# Patient Record
Sex: MALE | Race: WHITE | Employment: FULL TIME
[De-identification: names, ages, dates, MRNs, and addresses within clinical notes are randomized per-mention and may not be internally consistent; named-entity substitution may affect disease eponyms.]

---

## 2024-04-05 ENCOUNTER — HOSPITAL ENCOUNTER (OUTPATIENT)
Facility: HOSPITAL | Age: 64
Discharge: HOME | End: 2024-04-08
Payer: MEDICARE

## 2024-04-05 ENCOUNTER — TRANSCRIBE ORDERS (OUTPATIENT)
Facility: HOSPITAL | Age: 64
End: 2024-04-05

## 2024-04-05 VITALS
DIASTOLIC BLOOD PRESSURE: 85 MMHG | HEART RATE: 87 BPM | HEIGHT: 73 IN | WEIGHT: 236 LBS | BODY MASS INDEX: 31.28 KG/M2 | SYSTOLIC BLOOD PRESSURE: 146 MMHG

## 2024-04-05 DIAGNOSIS — C61 PROSTATE CANCER (HCC): Primary | ICD-10-CM

## 2024-04-05 DIAGNOSIS — C61 MALIGNANT NEOPLASM OF PROSTATE (HCC): Primary | ICD-10-CM

## 2024-04-05 PROCEDURE — 99202 OFFICE O/P NEW SF 15 MIN: CPT

## 2024-04-05 RX ORDER — ROSUVASTATIN CALCIUM 10 MG/1
10 TABLET, COATED ORAL DAILY
COMMUNITY

## 2024-04-05 RX ORDER — LOSARTAN POTASSIUM 50 MG/1
50 TABLET ORAL DAILY
COMMUNITY

## 2024-04-05 RX ORDER — BUPROPION HYDROCHLORIDE 150 MG/1
150 TABLET ORAL
COMMUNITY
Start: 2024-03-10

## 2024-04-05 RX ORDER — DICLOFENAC SODIUM 75 MG/1
75 TABLET, DELAYED RELEASE ORAL 2 TIMES DAILY
COMMUNITY
Start: 2023-12-20

## 2024-04-05 ASSESSMENT — PAIN SCALES - GENERAL: PAINLEVEL_OUTOF10: 1

## 2024-04-05 NOTE — CONSULTS
RADIATION ONCOLOGY NEW PATIENT CONSULT NOTE    Patient Name: Scot Norman  Patient YOB: 1960   Medical Record Number: [id number]  Referring Physician: Wili Salazar MD  [address] [id number]  Primary Care Provider: Tena Davey MD    DIAGNOSIS & STAGING:  Cancer Staging   No matching staging information was found for the patient.    ICD-10-CM    1. Prostate cancer (HCC)  C61         AJCC Staging has been reviewed      CHIEF COMPLAINT: Intermediate risk prostate cancer, Clayton 3 + 4 (in a total of 1/12 template cores and 1/1 MRI targets), PSA 7.1 ng/mL, T1c.    Indeterminate abnormal findings on PSMA PET/CT.       HISTORY OF PRESENT ILLNESS:      Mr. Norman is a 63 year old man with HTN, high cholesterol, and a new diagnosis prostate cancer. He was referred to urological attention for elevated PSA, most recently measured prior to consult as 7.1 ng/mL on 10/20/23.    MRI 1/4/24 demonstrated a 38 cc prostate with a PI-RADS 4 right mid/base transitional zone lesion, measured at 1.1 x 1.4 x 1.2 cm. No extracapsular extension. \"Multiple large right iliac lymph node including once left para-aortic lymph node and one in the right inguinal region concerning for metastases. 7 mm enhancing focus in the left iliac bone concerning for metastases\". I reviewed this MRI personally and agree with the radiology report.   His biopsy was 2/21/24 with Dr. Paredes. The prostate was 36.71 cc by TRUS, with clinical stage T1c. Pathology revealed cancer in 1/12 template cores and 1/1 MRI targets, with Clayton 3 + 4. He had a cancer talk with Dr. Salazar and I reviewed the note.    PSMA PET/CT 3/21/24 showed abnormal focal binding in prostate. Faint binding In multiple abnormal appearing lymph nodes. Unusual, could consider biopsy. No bone uptake or in left iliac bone. I reviewed this scan personally and agree with the radiology report.   He was accompanied to his consult by wife. His urinary symptoms are

## 2024-04-09 ENCOUNTER — TRANSCRIBE ORDERS (OUTPATIENT)
Facility: HOSPITAL | Age: 64
End: 2024-04-09

## 2024-04-09 DIAGNOSIS — C61 PROSTATE CANCER (HCC): Primary | ICD-10-CM

## 2024-04-24 ENCOUNTER — HOSPITAL ENCOUNTER (OUTPATIENT)
Facility: HOSPITAL | Age: 64
Discharge: HOME | End: 2024-04-27
Attending: RADIOLOGY
Payer: MEDICARE

## 2024-04-24 VITALS
TEMPERATURE: 98 F | OXYGEN SATURATION: 96 % | HEART RATE: 81 BPM | SYSTOLIC BLOOD PRESSURE: 126 MMHG | DIASTOLIC BLOOD PRESSURE: 68 MMHG | RESPIRATION RATE: 16 BRPM

## 2024-04-24 DIAGNOSIS — C61 PROSTATE CANCER (HCC): ICD-10-CM

## 2024-04-24 PROCEDURE — 2709999900 CT GUIDED NEEDLE PLACEMENT

## 2024-04-24 PROCEDURE — 6360000002 HC RX W HCPCS: Performed by: PHYSICIAN ASSISTANT

## 2024-04-24 PROCEDURE — 99153 MOD SED SAME PHYS/QHP EA: CPT

## 2024-04-24 PROCEDURE — 99152 MOD SED SAME PHYS/QHP 5/>YRS: CPT

## 2024-04-24 RX ORDER — MIDAZOLAM HYDROCHLORIDE 1 MG/ML
5 INJECTION INTRAMUSCULAR; INTRAVENOUS PRN
Status: DISCONTINUED | OUTPATIENT
Start: 2024-04-24 | End: 2024-04-24

## 2024-04-24 RX ORDER — FENTANYL CITRATE 50 UG/ML
100 INJECTION, SOLUTION INTRAMUSCULAR; INTRAVENOUS AS NEEDED
Status: DISCONTINUED | OUTPATIENT
Start: 2024-04-24 | End: 2024-04-24

## 2024-04-24 RX ADMIN — MIDAZOLAM 1 MG: 1 INJECTION INTRAMUSCULAR; INTRAVENOUS at 10:24

## 2024-04-24 RX ADMIN — FENTANYL CITRATE 25 MCG: 50 INJECTION INTRAMUSCULAR; INTRAVENOUS at 10:30

## 2024-04-24 RX ADMIN — FENTANYL CITRATE 25 MCG: 50 INJECTION INTRAMUSCULAR; INTRAVENOUS at 10:24

## 2024-04-24 RX ADMIN — MIDAZOLAM 1 MG: 1 INJECTION INTRAMUSCULAR; INTRAVENOUS at 10:27

## 2024-04-24 RX ADMIN — MIDAZOLAM 1 MG: 1 INJECTION INTRAMUSCULAR; INTRAVENOUS at 10:40

## 2024-04-24 RX ADMIN — FENTANYL CITRATE 25 MCG: 50 INJECTION INTRAMUSCULAR; INTRAVENOUS at 10:27

## 2024-04-24 RX ADMIN — MIDAZOLAM 1 MG: 1 INJECTION INTRAMUSCULAR; INTRAVENOUS at 10:30

## 2024-04-24 RX ADMIN — FENTANYL CITRATE 25 MCG: 50 INJECTION INTRAMUSCULAR; INTRAVENOUS at 10:40

## 2024-04-24 ASSESSMENT — PAIN SCALES - GENERAL: PAINLEVEL_OUTOF10: 0

## 2024-04-24 NOTE — PROGRESS NOTES
0800  Pt brought back to [facility] for scheduled CT guided right inguinal lymph node biopsy.  Confirmed NPO and ride.  0820  IV placed.  [facility], S1S2.  0835 Gena GRIJALVA in  to discuss procedure and sedation plan with pt and RN.  Allergies reviewed. Consent signed.  1015 Pt brought to CT for procedure.  Sedation started at 1024. Time out performed at  1029.  Procedure started at 1030 and ended at 1050.  Pt received a total of 4mg of versed and 100mcg of fentanyl over the course of procedure.  Pt tolerated procedure well.  Denies pain.  Dressing to procedure site CDI.   VS monitored throughout procedure.  1102 Pt returned to .  Given crackers and gingerale.  1108 Spoke with wife, to let her know procedure complete and time of discharge.   1130 Reviewed discharge instructions with patient.   Opportunity given for questions.  Pt verbalized understanding. Copy provided.  IV removed.     1150 Pt dressed.  Escorted pt out to vehicle in w/c for discharge to home with wife.

## 2024-04-24 NOTE — H&P
mcg  100 mcg IntraVENous PRN    midazolam (VERSED) injection 5 mg  5 mg IntraVENous PRN        Review of Systems:  Patient denies fever, chills, cough, headache, vision changes, difficulty swallowing, shortness of breath, chest pain, abdominal pain, nausea, vomiting, changes in bladder or bowel habits, extremity weakness, numbness, tingling or swelling. The remainder of the review of systems is negative for any additional contributing elements.        Physical Exam:  Blood pressure 129/82, pulse 80, resp. rate 14, SpO2 97 %.  General:  Calm, cooperative, NAD  HEENT:  NCAT, EOMI, conjunctiva clear, MMM  Heart:  RRR, S1S2 normal  Lungs:  CTAB, NWOB  Abdomen:  Soft, NT, ND  Extremities:  MAEW, no cyanosis or edema  Skin:  Warm and dry, color normal, no rashes  Neurological:  AAOX3, speech clear and coherent    Mallampati Airway Assessment: III (soft palate, base of uvula visible)    ASA Classification: ASA 2 - Patient with mild systemic disease with no functional limitations    Laboratory:    No results for input(s): \"HGB\", \"HGBEXT\", \"HCT\", \"HCTEXT\", \"WBC\", \"PLT\", \"PLTEXT\", \"PTT\", \"INR\", \"BUN\", \"K\", \"CRCLT\" in the last 72 hours.    Invalid input(s): \"PT\", \"CREA\"    Imaging:  All appropriate imaging has been reviewed by the radiologist.    Impression/Plan:  Patient has been evaluated and deemed an appropriate candidate for intravenous sedation. Based on history and presentation he is a candidate for CT-guided right inguinal lymph node biopsy.     The above procedure was explained to the patient/consenting party. Benefits, risks and alternative therapies reviewed and all questions answered to his satisfaction. At this time he wishes to proceed.     Please note that Dr. Irina Orta participated in the provision of these services. We appreciate the kind consultation and the opportunity to participate in Scot IRBY Emerson 's care.        Gena Michael PA-C  Interventional Radiology  [facility]  [facility]

## 2024-04-24 NOTE — DISCHARGE INSTRUCTIONS
BIOPSY DISCHARGE INSTRUCTIONS    General Instructions:     A biopsy is the removal of a small piece of tissue for microscopic examination or testing. Healthy tissue can be obtained for the purpose of tissue-type matching for transplants. Unhealthy tissues are more commonly biopsied to diagnose disease.     Liver Biopsy:     This test helps detect cancer, infections, and the cause of an enlargement of the liver or elevated liver enzymes. It also helps to diagnose a number of liver diseases. The pain associated with the procedure may be felt in the shoulder. The risks include internal bleeding, pneumothorax, and injury to the surrounding organs.       General Biopsy:     A mass can grow in any area of the body, and we are taking a specimen as ordered by your doctor. The risks are the same. They include bleeding, pain, and infection.     Home Care Instructions:     You may resume your regular diet and medication regimen. Do not drink alcohol, drive, or make any important legal decisions in the next 24 hours. Do not lift anything heavier than a gallon of milk until the soreness goes away. You may use over the counter acetaminophen or ibuprofen for the soreness. You may apply an ice pack to the affected area for 20-30 minutes at time for the first 24 hours. After that, you may apply a heat pack.     Call If:     You should call your Physician and/or the Radiology Nurse if you have any questions or concerns about the biopsy site. Call if you should have increased pain, fever, redness, drainage, or bleeding more than a small spot on the bandage.     Follow-Up Instructions: Please see your ordering doctor as he/she has requested.      [facility]  Radiology / Special Procedures Dept  [phone] or [phone]          Procedural Sedation Discharge instructions:     The sedation medication you received for your procedure today was Versed and /or Fentanyl. Please be cautious as you may be at higher risk for

## 2024-04-30 ENCOUNTER — HOSPITAL ENCOUNTER (OUTPATIENT)
Facility: HOSPITAL | Age: 64
Discharge: HOME | End: 2024-05-03

## 2024-04-30 VITALS
SYSTOLIC BLOOD PRESSURE: 152 MMHG | HEIGHT: 73 IN | DIASTOLIC BLOOD PRESSURE: 94 MMHG | HEART RATE: 74 BPM | RESPIRATION RATE: 16 BRPM | WEIGHT: 235 LBS | BODY MASS INDEX: 31.14 KG/M2

## 2024-04-30 DIAGNOSIS — C61 PROSTATE CANCER (HCC): Primary | ICD-10-CM

## 2024-04-30 ASSESSMENT — PAIN SCALES - GENERAL: PAINLEVEL_OUTOF10: 0

## 2024-04-30 NOTE — CONSULTS
intermediate risk prostate cancer, Clayton 3 + 4 (in a total of 1/12 template cores and 1/1 MRI targets), PSA 7.1 ng/mL, T1c.    Indeterminate abnormal findings on PSMA PET/CT.     CT guided core needle biopsy did not show malignancy. We discussed this and we will await the result of the two send out labs. At the time based on my discussion with the pathologist, we will move forward, low clinical suspicion of malignancy causing LAD.  He would like to move forward with SBRT here.    Plan:    - Follow pending pathology labs remaining on core needle biopsy of right inguinal lymph node. If either test is positive, will plan to refer to medical oncology for workup and comanagement.   - He would like to move forward with SBRT.  - I will ask my urology colleagues to place prostate fiducials along with [employer].   - CT/MR simulation to follow. Radiation would start about a week after simulation.    - We discussed that prostate SBRT would consist of 5 treatments delivered every other day over approximately 2 weeks.       - The patient and his wife were encouraged to call with any questions or concerns.        By signing my name below, I, Leana Hair, attest that this documentation has been prepared under the direct and in the presence of Kevin Hernandez MD.     ICHOLO MD, PhD, personally performed the services described in this documentation.  All medical record entries made by the scribe were at my direction and in my presence.  I have reviewed the chart and agree that the record reflects my personal performance and is accurate and complete.      TIME and COUNSELING:  I spent a total of 48 minutes in care of this patient on the date of service.         PAST MEDICAL HISTORY:  Past Medical History:   Diagnosis Date    Hyperlipidemia     Hypertension     Prostate cancer (HCC) 02/2024         PAST SURGICAL HISTORY:  Past Surgical History:   Procedure Laterality Date    ORTHOPEDIC SURGERY      left knee

## 2024-06-05 ENCOUNTER — HOSPITAL ENCOUNTER (OUTPATIENT)
Facility: HOSPITAL | Age: 64
Discharge: HOME | End: 2024-06-08
Attending: RADIOLOGY

## 2024-06-05 DIAGNOSIS — C61 PROSTATE CANCER (HCC): Primary | ICD-10-CM

## 2024-06-05 RX ORDER — LORAZEPAM 1 MG/1
1-2 TABLET ORAL
Qty: 2 TABLET | Refills: 0 | Status: SHIPPED | OUTPATIENT
Start: 2024-06-05 | End: 2024-06-05

## 2024-06-12 ENCOUNTER — HOSPITAL ENCOUNTER (OUTPATIENT)
Age: 64
Discharge: HOME | End: 2024-06-15
Payer: MEDICARE

## 2024-06-12 DIAGNOSIS — C61 MALIGNANT NEOPLASM OF PROSTATE (HCC): ICD-10-CM

## 2024-06-12 PROCEDURE — 76498 UNLISTED MR PROCEDURE: CPT

## 2024-06-25 ENCOUNTER — HOSPITAL ENCOUNTER (OUTPATIENT)
Facility: HOSPITAL | Age: 64
Discharge: HOME | End: 2024-06-28
Attending: RADIOLOGY

## 2024-06-25 LAB
RAD ONC ARIA COURSE FIRST TREATMENT DATE: NORMAL
RAD ONC ARIA COURSE ID: NORMAL
RAD ONC ARIA COURSE INTENT: NORMAL
RAD ONC ARIA COURSE LAST TREATMENT DATE: NORMAL
RAD ONC ARIA COURSE SESSION NUMBER: 1
RAD ONC ARIA COURSE START DATE: NORMAL
RAD ONC ARIA COURSE TREATMENT ELAPSED DAYS: 0
RAD ONC ARIA PLAN FRACTIONS TREATED TO DATE: 1
RAD ONC ARIA PLAN ID: NORMAL
RAD ONC ARIA PLAN PRESCRIBED DOSE PER FRACTION: 8 GY
RAD ONC ARIA PLAN PRIMARY REFERENCE POINT: NORMAL
RAD ONC ARIA PLAN TOTAL FRACTIONS PRESCRIBED: 5
RAD ONC ARIA PLAN TOTAL PRESCRIBED DOSE: 4000 CGY
RAD ONC ARIA REFERENCE POINT DOSAGE GIVEN TO DATE: 8 GY
RAD ONC ARIA REFERENCE POINT DOSAGE GIVEN TO DATE: 8.23 GY
RAD ONC ARIA REFERENCE POINT DOSAGE GIVEN TO DATE: 9 GY
RAD ONC ARIA REFERENCE POINT DOSAGE GIVEN TO DATE: 9 GY
RAD ONC ARIA REFERENCE POINT ID: NORMAL
RAD ONC ARIA REFERENCE POINT SESSION DOSAGE GIVEN: 8 GY
RAD ONC ARIA REFERENCE POINT SESSION DOSAGE GIVEN: 8.23 GY
RAD ONC ARIA REFERENCE POINT SESSION DOSAGE GIVEN: 9 GY
RAD ONC ARIA REFERENCE POINT SESSION DOSAGE GIVEN: 9 GY

## 2024-06-27 ENCOUNTER — HOSPITAL ENCOUNTER (OUTPATIENT)
Facility: HOSPITAL | Age: 64
Discharge: HOME | End: 2024-06-30
Attending: RADIOLOGY

## 2024-06-27 DIAGNOSIS — C61 PROSTATE CANCER (HCC): Primary | ICD-10-CM

## 2024-06-27 LAB
RAD ONC ARIA COURSE FIRST TREATMENT DATE: NORMAL
RAD ONC ARIA COURSE ID: NORMAL
RAD ONC ARIA COURSE INTENT: NORMAL
RAD ONC ARIA COURSE LAST TREATMENT DATE: NORMAL
RAD ONC ARIA COURSE SESSION NUMBER: 2
RAD ONC ARIA COURSE START DATE: NORMAL
RAD ONC ARIA COURSE TREATMENT ELAPSED DAYS: 2
RAD ONC ARIA PLAN FRACTIONS TREATED TO DATE: 2
RAD ONC ARIA PLAN ID: NORMAL
RAD ONC ARIA PLAN PRESCRIBED DOSE PER FRACTION: 8 GY
RAD ONC ARIA PLAN PRIMARY REFERENCE POINT: NORMAL
RAD ONC ARIA PLAN TOTAL FRACTIONS PRESCRIBED: 5
RAD ONC ARIA PLAN TOTAL PRESCRIBED DOSE: 4000 CGY
RAD ONC ARIA REFERENCE POINT DOSAGE GIVEN TO DATE: 16 GY
RAD ONC ARIA REFERENCE POINT DOSAGE GIVEN TO DATE: 16.47 GY
RAD ONC ARIA REFERENCE POINT DOSAGE GIVEN TO DATE: 18 GY
RAD ONC ARIA REFERENCE POINT DOSAGE GIVEN TO DATE: 18 GY
RAD ONC ARIA REFERENCE POINT ID: NORMAL
RAD ONC ARIA REFERENCE POINT SESSION DOSAGE GIVEN: 8 GY
RAD ONC ARIA REFERENCE POINT SESSION DOSAGE GIVEN: 8.23 GY
RAD ONC ARIA REFERENCE POINT SESSION DOSAGE GIVEN: 9 GY
RAD ONC ARIA REFERENCE POINT SESSION DOSAGE GIVEN: 9 GY

## 2024-06-27 RX ORDER — TAMSULOSIN HYDROCHLORIDE 0.4 MG/1
0.4 CAPSULE ORAL
Qty: 30 CAPSULE | Refills: 3 | Status: SHIPPED | OUTPATIENT
Start: 2024-06-27

## 2024-06-27 ASSESSMENT — PATIENT HEALTH QUESTIONNAIRE - PHQ9
SUM OF ALL RESPONSES TO PHQ QUESTIONS 1-9: 0
SUM OF ALL RESPONSES TO PHQ QUESTIONS 1-9: 0
1. LITTLE INTEREST OR PLEASURE IN DOING THINGS: NOT AT ALL
SUM OF ALL RESPONSES TO PHQ QUESTIONS 1-9: 0
SUM OF ALL RESPONSES TO PHQ9 QUESTIONS 1 & 2: 0
SUM OF ALL RESPONSES TO PHQ QUESTIONS 1-9: 0
2. FEELING DOWN, DEPRESSED OR HOPELESS: NOT AT ALL

## 2024-06-27 ASSESSMENT — PAIN SCALES - GENERAL: PAINLEVEL_OUTOF10: 0

## 2024-06-27 NOTE — PROGRESS NOTES
[facility]    Radiation Oncology Weekly Progress Note  Encounter Date: 24     Scot Norman Jr.  MRN: [id number]  : 1960       ICD-10-CM    1. Prostate cancer (HCC)  C61           Diagnosis   Favorable intermediate risk prostate cancer, Buford 3 + 4 (in a total of 12 template cores and  MRI targets), PSA 7.1 ng/mL, T1c.  Definitive radiation with prostate SBRT, 8 Gy x 5, with MRI/PSMA lesion microboost 9 Gy x 5.    AJCC Staging has been reviewed.    Interval History   Mr. Norman is a 64 y.o. male seen today for his weekly on-treatment evaluation    2024: Doing well today.  Accompanied by his wife.  Seen after his second fraction of prostate SBRT.  He took tamsulosin around the time of his biopsy and denies having had any side effects from it.  I will send him tamsulosin in anticipation of urinary side effects.  We reviewed the appropriate use of tamsulosin and its side effects, including the transient hypotension sometimes seen with the first few doses and how to identify this and manage it if so.  We reviewed the expected short-term side effects of radiation and the follow-up plan.  All questions answered.    Physical exam: NAD       Treatment Details:     Treatment Site Dose/Fx (cGy) #Fx Current Dose (cGy) Total Planned Dose (cGy) Start Date Most Recent Treatment Date   Prostate 800 2 1600 4000 24     Concurrent Therapy: No concurrent systemic therapy      Allergies and Medications     Allergies   Allergen Reactions    Augmentin [Amoxicillin-Pot Clavulanate]        Current Outpatient Medications   Medication Instructions    buPROPion (WELLBUTRIN XL) 150 mg    diclofenac (VOLTAREN) 75 mg, Oral, 2 TIMES DAILY    losartan (COZAAR) 50 mg, Oral, DAILY    rosuvastatin (CRESTOR) 10 mg, Oral, DAILY        Assessment & Plan   - Continue radiation treatment as planned  - Treatment setup and plan reviewed. Port images/CBCT images reviewed. Appropriate laboratory work

## 2024-07-01 ENCOUNTER — HOSPITAL ENCOUNTER (OUTPATIENT)
Facility: HOSPITAL | Age: 64
Discharge: HOME | End: 2024-07-04
Attending: RADIOLOGY

## 2024-07-01 LAB
RAD ONC ARIA COURSE FIRST TREATMENT DATE: NORMAL
RAD ONC ARIA COURSE ID: NORMAL
RAD ONC ARIA COURSE INTENT: NORMAL
RAD ONC ARIA COURSE LAST TREATMENT DATE: NORMAL
RAD ONC ARIA COURSE SESSION NUMBER: 3
RAD ONC ARIA COURSE START DATE: NORMAL
RAD ONC ARIA COURSE TREATMENT ELAPSED DAYS: 6
RAD ONC ARIA PLAN FRACTIONS TREATED TO DATE: 3
RAD ONC ARIA PLAN ID: NORMAL
RAD ONC ARIA PLAN PRESCRIBED DOSE PER FRACTION: 8 GY
RAD ONC ARIA PLAN PRIMARY REFERENCE POINT: NORMAL
RAD ONC ARIA PLAN TOTAL FRACTIONS PRESCRIBED: 5
RAD ONC ARIA PLAN TOTAL PRESCRIBED DOSE: 4000 CGY
RAD ONC ARIA REFERENCE POINT DOSAGE GIVEN TO DATE: 24 GY
RAD ONC ARIA REFERENCE POINT DOSAGE GIVEN TO DATE: 24.7 GY
RAD ONC ARIA REFERENCE POINT DOSAGE GIVEN TO DATE: 27 GY
RAD ONC ARIA REFERENCE POINT DOSAGE GIVEN TO DATE: 27 GY
RAD ONC ARIA REFERENCE POINT ID: NORMAL
RAD ONC ARIA REFERENCE POINT SESSION DOSAGE GIVEN: 8 GY
RAD ONC ARIA REFERENCE POINT SESSION DOSAGE GIVEN: 8.23 GY
RAD ONC ARIA REFERENCE POINT SESSION DOSAGE GIVEN: 9 GY
RAD ONC ARIA REFERENCE POINT SESSION DOSAGE GIVEN: 9 GY

## 2024-07-03 ENCOUNTER — HOSPITAL ENCOUNTER (OUTPATIENT)
Facility: HOSPITAL | Age: 64
Discharge: HOME | End: 2024-07-06
Attending: RADIOLOGY

## 2024-07-03 LAB
RAD ONC ARIA COURSE FIRST TREATMENT DATE: NORMAL
RAD ONC ARIA COURSE ID: NORMAL
RAD ONC ARIA COURSE INTENT: NORMAL
RAD ONC ARIA COURSE LAST TREATMENT DATE: NORMAL
RAD ONC ARIA COURSE SESSION NUMBER: 4
RAD ONC ARIA COURSE START DATE: NORMAL
RAD ONC ARIA COURSE TREATMENT ELAPSED DAYS: 8
RAD ONC ARIA PLAN FRACTIONS TREATED TO DATE: 4
RAD ONC ARIA PLAN ID: NORMAL
RAD ONC ARIA PLAN PRESCRIBED DOSE PER FRACTION: 8 GY
RAD ONC ARIA PLAN PRIMARY REFERENCE POINT: NORMAL
RAD ONC ARIA PLAN TOTAL FRACTIONS PRESCRIBED: 5
RAD ONC ARIA PLAN TOTAL PRESCRIBED DOSE: 4000 CGY
RAD ONC ARIA REFERENCE POINT DOSAGE GIVEN TO DATE: 32 GY
RAD ONC ARIA REFERENCE POINT DOSAGE GIVEN TO DATE: 32.93 GY
RAD ONC ARIA REFERENCE POINT DOSAGE GIVEN TO DATE: 36 GY
RAD ONC ARIA REFERENCE POINT DOSAGE GIVEN TO DATE: 36 GY
RAD ONC ARIA REFERENCE POINT ID: NORMAL
RAD ONC ARIA REFERENCE POINT SESSION DOSAGE GIVEN: 8 GY
RAD ONC ARIA REFERENCE POINT SESSION DOSAGE GIVEN: 8.23 GY
RAD ONC ARIA REFERENCE POINT SESSION DOSAGE GIVEN: 9 GY
RAD ONC ARIA REFERENCE POINT SESSION DOSAGE GIVEN: 9 GY

## 2024-07-05 ENCOUNTER — HOSPITAL ENCOUNTER (OUTPATIENT)
Facility: HOSPITAL | Age: 64
Discharge: HOME | End: 2024-07-08
Attending: RADIOLOGY

## 2024-07-05 ENCOUNTER — CLINICAL DOCUMENTATION (OUTPATIENT)
Facility: HOSPITAL | Age: 64
End: 2024-07-05

## 2024-07-05 DIAGNOSIS — C61 PROSTATE CANCER (HCC): Primary | ICD-10-CM

## 2024-07-05 LAB
RAD ONC ARIA COURSE FIRST TREATMENT DATE: NORMAL
RAD ONC ARIA COURSE ID: NORMAL
RAD ONC ARIA COURSE INTENT: NORMAL
RAD ONC ARIA COURSE LAST TREATMENT DATE: NORMAL
RAD ONC ARIA COURSE SESSION NUMBER: 5
RAD ONC ARIA COURSE START DATE: NORMAL
RAD ONC ARIA COURSE TREATMENT ELAPSED DAYS: 10
RAD ONC ARIA PLAN FRACTIONS TREATED TO DATE: 5
RAD ONC ARIA PLAN ID: NORMAL
RAD ONC ARIA PLAN PRESCRIBED DOSE PER FRACTION: 8 GY
RAD ONC ARIA PLAN PRIMARY REFERENCE POINT: NORMAL
RAD ONC ARIA PLAN TOTAL FRACTIONS PRESCRIBED: 5
RAD ONC ARIA PLAN TOTAL PRESCRIBED DOSE: 4000 CGY
RAD ONC ARIA REFERENCE POINT DOSAGE GIVEN TO DATE: 40 GY
RAD ONC ARIA REFERENCE POINT DOSAGE GIVEN TO DATE: 41.16 GY
RAD ONC ARIA REFERENCE POINT DOSAGE GIVEN TO DATE: 45 GY
RAD ONC ARIA REFERENCE POINT DOSAGE GIVEN TO DATE: 45 GY
RAD ONC ARIA REFERENCE POINT ID: NORMAL
RAD ONC ARIA REFERENCE POINT SESSION DOSAGE GIVEN: 8 GY
RAD ONC ARIA REFERENCE POINT SESSION DOSAGE GIVEN: 8.23 GY
RAD ONC ARIA REFERENCE POINT SESSION DOSAGE GIVEN: 9 GY
RAD ONC ARIA REFERENCE POINT SESSION DOSAGE GIVEN: 9 GY

## 2024-07-08 NOTE — PROGRESS NOTES
[facility] [place] [facility]  [facility]      RADIATION ONCOLOGY CLINICAL END OF TREATMENT NOTE    Encounter Date: 7/5/2024   Patient Name: Scot Norman Jr.  YOB: 1960  Medical Record Number: [id number]    DIAGNOSIS AND STAGING:       ICD-10-CM    1. Prostate cancer (HCC)  C61          Cancer Staging   No matching staging information was found for the patient.  AJCC Staging has been reviewed    CLINICAL SUMMARY:   Favorable intermediate risk prostate cancer, Clayton 3 + 4 (in a total of 1/12 template cores and 1/1 MRI targets), PSA 7.1 ng/mL, T1c.  Definitive radiation with prostate SBRT, 8 Gy x 5, with MRI/PSMA lesion microboost 9 Gy x 5.       TREATMENT SUMMARY:     Course: C1    Treatment Site Ref. ID Energy Dose/Fx (cGy) #Fx Dose Correction (cGy) Total Dose (cGy) Start Date End Date Elapsed Days   prostSV_SBRT [id number] 6X 800 5 / 5 0 4,000 6/25/2024 7/5/2024 10       CONCURRENT THERAPY: None    TREATMENT COMPLETED:  Treatment completed as planned      CLINICAL COMMENTS:   Overall tolerated treatment well.  Tamsulosin prescribed in anticipation of urinary side-effects.       FOLLOW UP:   See Dr. Salazar as scheduled. Return to this clinic 3 months later with PSA, or sooner as needed.       [facility]  [facility]  [address], [address], [place] [address]  P: [phone]  [facility] [address], [place] [address]  P: [phone]  [facility] [address], [place] [address]  P: [phone]

## 2024-09-23 RX ORDER — TAMSULOSIN HYDROCHLORIDE 0.4 MG/1
0.4 CAPSULE ORAL
Qty: 90 CAPSULE | Refills: 1 | Status: SHIPPED | OUTPATIENT
Start: 2024-09-23

## 2024-11-27 DIAGNOSIS — C61 MALIGNANT NEOPLASM OF PROSTATE (HCC): Primary | ICD-10-CM

## 2024-12-18 ENCOUNTER — HOSPITAL ENCOUNTER (EMERGENCY)
Facility: HOSPITAL | Age: 64
Discharge: HOME | End: 2024-12-18
Attending: EMERGENCY MEDICINE
Payer: MEDICARE

## 2024-12-18 VITALS
SYSTOLIC BLOOD PRESSURE: 139 MMHG | DIASTOLIC BLOOD PRESSURE: 86 MMHG | HEART RATE: 85 BPM | TEMPERATURE: 97.7 F | BODY MASS INDEX: 30.48 KG/M2 | RESPIRATION RATE: 18 BRPM | OXYGEN SATURATION: 100 % | HEIGHT: 73 IN | WEIGHT: 230 LBS

## 2024-12-18 DIAGNOSIS — S61.212A LACERATION OF RIGHT MIDDLE FINGER WITHOUT FOREIGN BODY WITHOUT DAMAGE TO NAIL, INITIAL ENCOUNTER: Primary | ICD-10-CM

## 2024-12-18 PROCEDURE — 99283 EMERGENCY DEPT VISIT LOW MDM: CPT

## 2024-12-18 PROCEDURE — 6370000000 HC RX 637 (ALT 250 FOR IP)

## 2024-12-18 RX ORDER — ACETAMINOPHEN 325 MG/1
650 TABLET ORAL
Status: COMPLETED | OUTPATIENT
Start: 2024-12-18 | End: 2024-12-18

## 2024-12-18 RX ADMIN — ACETAMINOPHEN 650 MG: 325 TABLET ORAL at 21:30

## 2024-12-18 RX ADMIN — Medication 3 ML: at 21:44

## 2024-12-18 ASSESSMENT — PAIN SCALES - GENERAL
PAINLEVEL_OUTOF10: 3
PAINLEVEL_OUTOF10: 8
PAINLEVEL_OUTOF10: 8

## 2024-12-18 ASSESSMENT — PAIN - FUNCTIONAL ASSESSMENT: PAIN_FUNCTIONAL_ASSESSMENT: 0-10

## 2024-12-18 ASSESSMENT — PAIN DESCRIPTION - DESCRIPTORS: DESCRIPTORS: THROBBING

## 2024-12-19 NOTE — ED TRIAGE NOTES
Pt ambulatory to ED w/ c/o laceration to middle right finger from potato slicer around 1500. Pt denies blood thinner use. Pt tried to control bleeding at home with pressure.     Pt endorses up to date on tetanus.     New 4 x 4 dressing applied in triage with pressure.

## 2024-12-19 NOTE — ED PROVIDER NOTES
[facility] EMERGENCY DEPT  EMERGENCY DEPARTMENT ENCOUNTER      Pt Name: Scot Norman Jr.  MRN: [id number]  Birthdate 1960  Date of evaluation: 12/18/2024  Provider: Raegan Mccormack PA-C    CHIEF COMPLAINT       Chief Complaint   Patient presents with    Laceration         HISTORY OF PRESENT ILLNESS   (Location/Symptom, Timing/Onset, Context/Setting, Quality, Duration, Modifying Factors, Severity)  Note limiting factors.   64-year-old male presenting for a laceration to his right middle finger.  Patient states that he was using a potato cutter around 3 PM today and he sliced the tip of his finger off.  Patient states that it was continuously bleeding so he applied pressure and bandaged it and went to dinner.  After dinner he went to take off the bandage to check it out and now it will not stop bleeding.  He does not take any blood thinners.  His tetanus was updated last year.  There is no other injury.            Review of External Medical Records:     Nursing Notes were reviewed.    REVIEW OF SYSTEMS    (2-9 systems for level 4, 10 or more for level 5)     Review of Systems    Except as noted above the remainder of the review of systems was reviewed and negative.       PAST MEDICAL HISTORY     Past Medical History:   Diagnosis Date    Hyperlipidemia     Hypertension     Prostate cancer (HCC) 02/2024         SURGICAL HISTORY       Past Surgical History:   Procedure Laterality Date    ORTHOPEDIC SURGERY      left knee         CURRENT MEDICATIONS       Discharge Medication List as of 12/18/2024 10:43 PM        CONTINUE these medications which have NOT CHANGED    Details   tamsulosin (FLOMAX) 0.4 MG capsule TAKE 1 CAPSULE BY MOUTH DAILY WITH SUPPER, Disp-90 capsule, R-1Normal      diclofenac (VOLTAREN) 75 MG EC tablet Take 1 tablet by mouth 2 times dailyHistorical Med      losartan (COZAAR) 50 MG tablet Take 1 tablet by mouth dailyHistorical Med      rosuvastatin (CRESTOR) 10 MG tablet Take 1 tablet by mouth

## 2024-12-19 NOTE — DISCHARGE INSTRUCTIONS
You should keep the bandage on for at least 1 day and keep it clean and dry.  After that you may take off the dressing.  There may be minimal bleeding, but if you experience prolonged or increased bleeding that will not stop then should come back.  Also as discussed, if you notice any signs of infection developing then should come back.  You may continue to put a bandage over the affected area as needed.  You may take Tylenol as needed for pain.      Thank you for allowing us to provide you with medical care today.  We realize that you have many choices for your emergency care needs.  We thank you for choosing [facility].  Please choose us in the future for any continued health care needs.     The exam and treatment you received in the Emergency Department were for an emergent problem and are not intended as complete care. It is important that you follow up with a doctor, nurse practitioner, or physician assistant for ongoing care. If your symptoms worsen or you do not improve as expected and you are unable to reach your usual health care provider, you should return to the Emergency Department. We are available 24 hours a day.     Please make an appointment with your healthcare provider(s) for follow up of your Emergency Department visit.  Take this sheet with you when you go to your follow-up visit.

## 2024-12-26 ENCOUNTER — HOSPITAL ENCOUNTER (OUTPATIENT)
Facility: HOSPITAL | Age: 64
Discharge: HOME | End: 2024-12-29

## 2024-12-26 DIAGNOSIS — C61 MALIGNANT NEOPLASM OF PROSTATE (HCC): ICD-10-CM

## 2024-12-27 LAB — PSA SERPL DL<=0.01 NG/ML-MCNC: 2.89 NG/ML (ref 0–4)

## 2025-01-06 ENCOUNTER — HOSPITAL ENCOUNTER (OUTPATIENT)
Facility: HOSPITAL | Age: 65
Discharge: HOME | End: 2025-01-09
Attending: RADIOLOGY

## 2025-01-06 VITALS
SYSTOLIC BLOOD PRESSURE: 125 MMHG | DIASTOLIC BLOOD PRESSURE: 82 MMHG | WEIGHT: 230 LBS | HEIGHT: 73 IN | BODY MASS INDEX: 30.48 KG/M2 | RESPIRATION RATE: 16 BRPM | HEART RATE: 70 BPM

## 2025-01-06 DIAGNOSIS — C61 PROSTATE CANCER (HCC): Primary | ICD-10-CM

## 2025-01-06 ASSESSMENT — PAIN SCALES - GENERAL: PAINLEVEL_OUTOF10: 0

## 2025-01-06 NOTE — CONSULTS
History     Occupational History    Not on file   Tobacco Use    Smoking status: Never    Smokeless tobacco: Not on file   Substance and Sexual Activity    Alcohol use: Not Currently    Drug use: Not on file    Sexual activity: Not on file       ALLERGIES/MEDICATIONS:    Allergies   Allergen Reactions    Augmentin [Amoxicillin-Pot Clavulanate]      Current Outpatient Medications   Medication Sig Dispense Refill    tamsulosin (FLOMAX) 0.4 MG capsule TAKE 1 CAPSULE BY MOUTH DAILY WITH SUPPER 90 capsule 1    diclofenac (VOLTAREN) 75 MG EC tablet Take 1 tablet by mouth 2 times daily      losartan (COZAAR) 50 MG tablet Take 1 tablet by mouth daily      rosuvastatin (CRESTOR) 10 MG tablet Take 1 tablet by mouth daily      buPROPion (WELLBUTRIN XL) 150 MG extended release tablet 1 tablet       No current facility-administered medications for this encounter.

## 2025-03-20 ENCOUNTER — ANESTHESIA EVENT (OUTPATIENT)
Facility: HOSPITAL | Age: 65
End: 2025-03-20
Payer: MEDICARE

## 2025-03-20 ENCOUNTER — ANESTHESIA (OUTPATIENT)
Facility: HOSPITAL | Age: 65
End: 2025-03-20
Payer: MEDICARE

## 2025-03-20 ENCOUNTER — HOSPITAL ENCOUNTER (OUTPATIENT)
Facility: HOSPITAL | Age: 65
Setting detail: OUTPATIENT SURGERY
Discharge: HOME | End: 2025-03-20
Attending: INTERNAL MEDICINE | Admitting: INTERNAL MEDICINE
Payer: MEDICARE

## 2025-03-20 VITALS
HEIGHT: 73 IN | WEIGHT: 229.5 LBS | SYSTOLIC BLOOD PRESSURE: 132 MMHG | TEMPERATURE: 97.8 F | OXYGEN SATURATION: 99 % | RESPIRATION RATE: 22 BRPM | HEART RATE: 78 BPM | DIASTOLIC BLOOD PRESSURE: 80 MMHG | BODY MASS INDEX: 30.42 KG/M2

## 2025-03-20 PROCEDURE — 3600007512: Performed by: INTERNAL MEDICINE

## 2025-03-20 PROCEDURE — 7100000010 HC PHASE II RECOVERY - FIRST 15 MIN: Performed by: INTERNAL MEDICINE

## 2025-03-20 PROCEDURE — 7100000011 HC PHASE II RECOVERY - ADDTL 15 MIN: Performed by: INTERNAL MEDICINE

## 2025-03-20 PROCEDURE — 3700000001 HC ADD 15 MINUTES (ANESTHESIA): Performed by: INTERNAL MEDICINE

## 2025-03-20 PROCEDURE — 3700000000 HC ANESTHESIA ATTENDED CARE: Performed by: INTERNAL MEDICINE

## 2025-03-20 PROCEDURE — 6360000002 HC RX W HCPCS: Performed by: NURSE ANESTHETIST, CERTIFIED REGISTERED

## 2025-03-20 PROCEDURE — 3600007502: Performed by: INTERNAL MEDICINE

## 2025-03-20 PROCEDURE — 2580000003 HC RX 258: Performed by: NURSE ANESTHETIST, CERTIFIED REGISTERED

## 2025-03-20 RX ORDER — PROPOFOL 10 MG/ML
INJECTION, EMULSION INTRAVENOUS
Status: DISCONTINUED | OUTPATIENT
Start: 2025-03-20 | End: 2025-03-20 | Stop reason: SDUPTHER

## 2025-03-20 RX ORDER — SODIUM CHLORIDE 9 MG/ML
INJECTION, SOLUTION INTRAVENOUS
Status: DISCONTINUED | OUTPATIENT
Start: 2025-03-20 | End: 2025-03-20 | Stop reason: SDUPTHER

## 2025-03-20 RX ORDER — SODIUM CHLORIDE 9 MG/ML
INJECTION, SOLUTION INTRAVENOUS PRN
Status: DISCONTINUED | OUTPATIENT
Start: 2025-03-20 | End: 2025-03-20 | Stop reason: HOSPADM

## 2025-03-20 RX ADMIN — LIDOCAINE HYDROCHLORIDE 60 MG: 20 INJECTION, SOLUTION INFILTRATION; PERINEURAL at 10:39

## 2025-03-20 RX ADMIN — PROPOFOL 100 MG: 10 INJECTION, EMULSION INTRAVENOUS at 10:39

## 2025-03-20 RX ADMIN — SODIUM CHLORIDE: 9 INJECTION, SOLUTION INTRAVENOUS at 09:58

## 2025-03-20 RX ADMIN — PROPOFOL 140 MCG/KG/MIN: 10 INJECTION, EMULSION INTRAVENOUS at 10:40

## 2025-03-20 ASSESSMENT — PAIN SCALES - GENERAL
PAINLEVEL_OUTOF10: 0

## 2025-03-20 ASSESSMENT — PAIN - FUNCTIONAL ASSESSMENT: PAIN_FUNCTIONAL_ASSESSMENT: 0-10

## 2025-03-20 NOTE — ANESTHESIA POSTPROCEDURE EVALUATION
Department of Anesthesiology  Postprocedure Note    Patient: Scot Norman Jr.  MRN: [id number]  YOB: 1960  Date of evaluation: 3/20/2025    Procedure Summary       Date: 03/20/25 Room / Location: [address] [address] / [facility] ENDOSCOPY    Anesthesia Start: 1035 Anesthesia Stop: 1058    Procedure: COLONOSCOPY asu 6 (Lower GI Region) Diagnosis:       Colon cancer screening      (Colon cancer screening [Z12.11])    Surgeons: David Benedict MD Responsible Provider: Balwinder Vigil MD    Anesthesia Type: MAC ASA Status: 2            Anesthesia Type: No value filed.    George Phase I: George Score: 10    George Phase II: George Score: 10    Anesthesia Post Evaluation    Patient location during evaluation: PACU  Patient participation: complete - patient participated  Level of consciousness: awake  Airway patency: patent  Nausea & Vomiting: no vomiting and no nausea  Cardiovascular status: hemodynamically stable  Respiratory status: acceptable  Hydration status: stable  Pain management: adequate    No notable events documented.

## 2025-03-20 NOTE — H&P
Pre-Endoscopy H&P Update    Chief complaint/HPI/ROS:    The indication for the procedure, the patient's history and the patient's current medications are reviewed prior to the procedure and that data is reported on the H&P to which this document is attached.  Any significant complaints with regard to organ systems will be noted, and if not mentioned then a review of systems is not contributory.    Past Medical History:   Diagnosis Date    Hyperlipidemia     Hypertension     Prostate cancer (HCC) 2024      Past Surgical History:   Procedure Laterality Date    ORTHOPEDIC SURGERY      left knee     Social   Social History     Tobacco Use    Smoking status: Never    Smokeless tobacco: Not on file   Substance Use Topics    Alcohol use: Not Currently      Family History   Problem Relation Age of Onset    Breast Cancer Mother       Allergies   Allergen Reactions    Augmentin [Amoxicillin-Pot Clavulanate]       Prior to Admission Medications   Prescriptions Last Dose Informant Patient Reported? Taking?   buPROPion (WELLBUTRIN XL) 150 MG extended release tablet 3/19/2025  Yes Yes   Si tablet   diclofenac (VOLTAREN) 75 MG EC tablet 3/13/2025  Yes No   Sig: Take 1 tablet by mouth 2 times daily   losartan (COZAAR) 50 MG tablet 3/20/2025 Morning  Yes Yes   Sig: Take 1 tablet by mouth daily   rosuvastatin (CRESTOR) 10 MG tablet 3/19/2025  Yes Yes   Sig: Take 1 tablet by mouth daily   tamsulosin (FLOMAX) 0.4 MG capsule   No No   Sig: TAKE 1 CAPSULE BY MOUTH DAILY WITH SUPPER      Facility-Administered Medications: None       PHYSICAL EXAM:  The patient is examined immediately prior to the procedure.    Vitals:    25 1001   BP: (!) 145/80   Pulse: 87   Resp: 14   Temp: 97.5 °F (36.4 °C)   SpO2: 95%       Gen: Appears comfortable, no distress.  Pulm: comfortable respirations with no abnormal audible breath sounds  HEART: well perfused, no abnormal audible heart sounds  GI: abdomen flat.    PLAN:  Informed consent

## 2025-03-20 NOTE — PROGRESS NOTES
Initial RN admission and assessment performed and documented in Endoscopy navigator.     Patient evaluated by anesthesia in pre-procedure holding.     All procedural vital signs, airway assessment, and level of consciousness information monitored and recorded by anesthesia staff on the anesthesia record.     Report received from CRNA post procedure.  Patient transported to recovery area by RN.    Endoscopy post procedure time out was performed and specimens were verified by physician.    Endoscope was pre-cleaned at bedside immediately following procedure by alondra Skaggs.

## 2025-03-20 NOTE — OP NOTE
[facility]  [facility]  David Benedict MD  [phone]      2025    Colonoscopy Procedure Note  Scot Norman Jr.  :  1960  [facility] Medical Record Number: [id number]    Indications:   Screening colonoscopy.  PCP:  Tena Davey MD  Anesthesia/Sedation: Conscious Sedation/Moderate Sedation/GETA, see notes  Endoscopist:  Dr. David Benedict  Complications:  None  Estimated Blood Loss:  None    Permit:  The indications, risks, benefits and alternatives were reviewed with the patient or their decision maker who was provided an opportunity to ask questions and all questions were answered.  The specific risks of colonoscopy with conscious sedation were reviewed, including but not limited to anesthetic complication, bleeding, adverse drug reaction, missed lesion, infection, IV site reactions, and intestinal perforation which would lead to the need for surgical repair.  Alternatives to colonoscopy including radiographic imaging, observation without testing, or laboratory testing were reviewed including the limitations of those alternatives.  After considering the options and having all their questions answered, the patient or their decision maker provided both verbal and written consent to proceed.        Procedure in Detail:  After obtaining informed consent, positioning of the patient in the left lateral decubitus position, and conduction of a pre-procedure pause or \"time out\" the endoscope was introduced into the anus and advanced to the cecum, which was identified by the ileocecal valve and appendiceal orifice.  The quality of the colonic preparation was excellent.  A careful inspection was made as the colonoscope was withdrawn, findings and interventions are described below.    Findings:   ACE: Normal.  Rectum: Grade 1 internal hemorrhoids, seen on retroflexed views.  Sigmoid colon: Normal.  Descending colon: Normal.  Transverse  colon: Normal.  Ascending colon: Normal.  Cecum: Normal.  Terminal ileum: Not intubated.    Specimens:    none  See above    Complications:   None; patient tolerated the procedure well.    Impression:  Internal hemorrhoids.    Recommendations:   1.  Patient can resume all medications and diet.  2.  Screening colonoscopy is recommended in 10 years.    Thank you for entrusting me with this patient's care.  Please do not hesitate to contact me with any questions or if I can be of assistance with any of your other patients' GI needs.    Signed By: David Benedict MD                        March 20, 2025      Surgical assistant none.  Implants none unless specified.

## 2025-03-20 NOTE — ANESTHESIA PRE PROCEDURE
Department of Anesthesiology  Preprocedure Note       Name:  Scot Norman Jr.   Age:  64 y.o.  :  1960                                          MRN:  [id number]         Date:  3/20/2025      Surgeon: Surgeon(s):  David Benedict MD    Procedure: Procedure(s):  COLONOSCOPY asu 6    Medications prior to admission:   Prior to Admission medications    Medication Sig Start Date End Date Taking? Authorizing Provider   losartan (COZAAR) 50 MG tablet Take 1 tablet by mouth daily   Yes ProviderShannon MD   rosuvastatin (CRESTOR) 10 MG tablet Take 1 tablet by mouth daily   Yes Provider, MD Shannon   buPROPion (WELLBUTRIN XL) 150 MG extended release tablet 1 tablet 3/10/24  Yes ProviderShannon MD   tamsulosin (FLOMAX) 0.4 MG capsule TAKE 1 CAPSULE BY MOUTH DAILY WITH SUPPER 24   RICARDO Hernandez MD   diclofenac (VOLTAREN) 75 MG EC tablet Take 1 tablet by mouth 2 times daily 23   ProviderShannon MD       Current medications:    No current facility-administered medications for this encounter.     Facility-Administered Medications Ordered in Other Encounters   Medication Dose Route Frequency Provider Last Rate Last Admin   • 0.9 % sodium chloride infusion   IntraVENous Continuous PRN Bethany Pisano, JEREMIAH - CRNA   New Bag at 25 [phone]       Allergies:    Allergies   Allergen Reactions   • Augmentin [Amoxicillin-Pot Clavulanate]        Problem List:    Patient Active Problem List   Diagnosis Code   • Prostate cancer (HCC) C61       Past Medical History:        Diagnosis Date   • Hyperlipidemia    • Hypertension    • Prostate cancer (HCC) 2024       Past Surgical History:        Procedure Laterality Date   • ORTHOPEDIC SURGERY      left knee       Social History:    Social History     Tobacco Use   • Smoking status: Never   • Smokeless tobacco: Not on file   Substance Use Topics   • Alcohol use: Not Currently                                Counseling given: Not

## 2025-03-20 NOTE — DISCHARGE INSTRUCTIONS
[facility]  [facility]  David Benedict MD  [phone]      March 20, 2025    Scot Norman Jr.  YOB: 1960    ENDOSCOPY DISCHARGE INSTRUCTIONS    If there is redness at IV site you should apply warm compress to area.  If redness or soreness persist contact your primary care doctor.    There may be a slight amount of blood passed from the rectum.  Gaseous discomfort may develop, but walking, belching will help relieve this.  You may not operate a vehicle for 12 hours  You may not operate machinery or dangerous appliances for rest of today  You may not drink alcoholic beverages for 12 hours  Avoid making any critical decisions for 24 hours    DIET:  You may resume your normal diet, but some patients find that heavy or large meals may lead to indigestion or vomiting.  I suggest a light meal as first food intake.    MEDICATIONS:  The use of some over-the-counter pain medication may lead to bleeding after colon biopsies or polyp removal.  Tylenol (also called acetaminophen) is safe to take even if you have had colonoscopy with polyp removal.  Based on the procedure you had today you may safely take aspirin or aspirin-like products for the next ten (10) days.  Remember that Tylenol (also called acetaminophen) is safe to take after colonoscopy even if you have had biopsies or polyps removed.    ACTIVITY:  You may resume your normal household activities, but it is recommended that you spend the remainder of the day resting -  avoid any strenuous activity.    CALL DR. BENEDICT'S OFFICE IF:  Increasing pain, nausea, vomiting  Abdominal distension (swelling)  Significant new or increased bleeding (oral or rectal)  Fever/Chills  Chest pain/shortness of breath                       Additional instructions:   Impression:  Internal hemorrhoids.    Recommendations:   1.  Patient can resume all medications and diet.  2.  Screening colonoscopy is  recommended in 10 years.     It was an honor to be your doctor today.  Please let me or my office staff know if you have any feedback about today's procedure.    David Benedict MD

## 2025-05-01 ENCOUNTER — OFFICE VISIT (OUTPATIENT)
Age: 65
End: 2025-05-01
Payer: MEDICARE

## 2025-05-01 VITALS
BODY MASS INDEX: 31.89 KG/M2 | OXYGEN SATURATION: 95 % | WEIGHT: 240.6 LBS | DIASTOLIC BLOOD PRESSURE: 76 MMHG | HEART RATE: 91 BPM | HEIGHT: 73 IN | SYSTOLIC BLOOD PRESSURE: 106 MMHG | RESPIRATION RATE: 18 BRPM

## 2025-05-01 DIAGNOSIS — Z85.46 HISTORY OF MALIGNANT NEOPLASM OF PROSTATE: ICD-10-CM

## 2025-05-01 DIAGNOSIS — R22.1 NECK MASS: Primary | ICD-10-CM

## 2025-05-01 PROCEDURE — 99204 OFFICE O/P NEW MOD 45 MIN: CPT | Performed by: OTOLARYNGOLOGY

## 2025-05-01 PROCEDURE — 1036F TOBACCO NON-USER: CPT | Performed by: OTOLARYNGOLOGY

## 2025-05-01 PROCEDURE — 1159F MED LIST DOCD IN RCRD: CPT | Performed by: OTOLARYNGOLOGY

## 2025-05-01 PROCEDURE — G8427 DOCREV CUR MEDS BY ELIG CLIN: HCPCS | Performed by: OTOLARYNGOLOGY

## 2025-05-01 PROCEDURE — 1123F ACP DISCUSS/DSCN MKR DOCD: CPT | Performed by: OTOLARYNGOLOGY

## 2025-05-01 PROCEDURE — G8417 CALC BMI ABV UP PARAM F/U: HCPCS | Performed by: OTOLARYNGOLOGY

## 2025-05-01 PROCEDURE — 3017F COLORECTAL CA SCREEN DOC REV: CPT | Performed by: OTOLARYNGOLOGY

## 2025-05-01 RX ORDER — KETOCONAZOLE 20 MG/ML
SHAMPOO, SUSPENSION TOPICAL
COMMUNITY

## 2025-05-01 RX ORDER — TRIAMCINOLONE ACETONIDE 1 MG/G
OINTMENT TOPICAL
COMMUNITY
Start: 2025-02-27

## 2025-05-01 RX ORDER — TADALAFIL 5 MG/1
5 TABLET ORAL DAILY
COMMUNITY

## 2025-05-01 NOTE — PROGRESS NOTES
Otolaryngology-Head and Neck Surgery  New Patient Visit     Patient: Scot Norman Jr.  YOB: 1960  MRN: [id number]  Date of Service: 5/1/2025    Chief Complaint:   Chief Complaint   Patient presents with    New Patient     Cyst on jaw bone         History of Present Illness: Scot Norman Jr. is a 65 y.o. male who presents today for discussion of a left facial mass    Notes in the last 2 months  Seems stable  Non bothersome    Subcutaneous, just over jaw    Seen by dermatology - referred to us    Denies prior skin cancer hx  Does have eczema    No trauma  No dental issues    Hx of prostate CA - x/p XRT  Has upcoming PET CT    Past Medical History:  Past Medical History:   Diagnosis Date    Hyperlipidemia     Hypertension     Prostate cancer (HCC) 02/2024    Rash 2000       Past Surgical History:   Past Surgical History:   Procedure Laterality Date    COLONOSCOPY N/A 03/20/2025    COLONOSCOPY asu 6 performed by David Benedict MD at [facility] ENDOSCOPY    ORTHOPEDIC SURGERY      left knee    TONSILLECTOMY  1963       Medications:   Current Outpatient Medications   Medication Instructions    buPROPion (WELLBUTRIN XL) 150 mg    diclofenac (VOLTAREN) 75 mg, 2 TIMES DAILY    ketoconazole (NIZORAL) 2 % shampoo     losartan (COZAAR) 50 mg, DAILY    rosuvastatin (CRESTOR) 10 mg, DAILY    tadalafil (CIALIS) 5 mg, DAILY    tamsulosin (FLOMAX) 0.4 mg, Oral, DAILY WITH DINNER    triamcinolone (KENALOG) 0.1 % ointment APPLY OINTMENT TWICE DAILY TO RASH ON BODY FOR UP 2 TO WEEKS AT A TIME AS NEEDED FOR FLARES       Allergies:   Allergies   Allergen Reactions    Augmentin [Amoxicillin-Pot Clavulanate]        Social History:   Social History     Tobacco Use    Smoking status: Never     Passive exposure: Never    Smokeless tobacco: Never   Substance Use Topics    Alcohol use: Not Currently    Drug use: Never        Family History:  Family History   Problem Relation Age of Onset    Breast Cancer Mother     Cancer Mother

## 2025-05-27 ENCOUNTER — HOSPITAL ENCOUNTER (OUTPATIENT)
Facility: HOSPITAL | Age: 65
Discharge: HOME | End: 2025-05-30
Attending: OTOLARYNGOLOGY
Payer: MEDICARE

## 2025-05-27 DIAGNOSIS — R22.1 NECK MASS: ICD-10-CM

## 2025-05-27 LAB — CREAT BLD-MCNC: 1 MG/DL (ref 0.6–1.3)

## 2025-05-27 PROCEDURE — 82565 ASSAY OF CREATININE: CPT

## 2025-05-27 PROCEDURE — 70491 CT SOFT TISSUE NECK W/DYE: CPT

## 2025-05-27 PROCEDURE — 6360000004 HC RX CONTRAST MEDICATION: Performed by: OTOLARYNGOLOGY

## 2025-05-27 RX ORDER — IOPAMIDOL 755 MG/ML
100 INJECTION, SOLUTION INTRAVASCULAR
Status: COMPLETED | OUTPATIENT
Start: 2025-05-27 | End: 2025-05-27

## 2025-05-27 RX ADMIN — IOPAMIDOL 100 ML: 755 INJECTION, SOLUTION INTRAVENOUS at 09:41

## 2025-06-04 ENCOUNTER — RESULTS FOLLOW-UP (OUTPATIENT)
Age: 65
End: 2025-06-04

## 2025-06-09 ENCOUNTER — OFFICE VISIT (OUTPATIENT)
Age: 65
End: 2025-06-09
Payer: MEDICARE

## 2025-06-09 VITALS
DIASTOLIC BLOOD PRESSURE: 74 MMHG | SYSTOLIC BLOOD PRESSURE: 118 MMHG | WEIGHT: 241.2 LBS | OXYGEN SATURATION: 95 % | RESPIRATION RATE: 18 BRPM | BODY MASS INDEX: 31.97 KG/M2 | HEART RATE: 85 BPM | HEIGHT: 73 IN

## 2025-06-09 DIAGNOSIS — Z85.46 HISTORY OF MALIGNANT NEOPLASM OF PROSTATE: ICD-10-CM

## 2025-06-09 DIAGNOSIS — R22.1 NECK MASS: Primary | ICD-10-CM

## 2025-06-09 DIAGNOSIS — D17.0 LIPOMA OF FACE: ICD-10-CM

## 2025-06-09 PROCEDURE — 1036F TOBACCO NON-USER: CPT | Performed by: OTOLARYNGOLOGY

## 2025-06-09 PROCEDURE — 1159F MED LIST DOCD IN RCRD: CPT | Performed by: OTOLARYNGOLOGY

## 2025-06-09 PROCEDURE — 99213 OFFICE O/P EST LOW 20 MIN: CPT | Performed by: OTOLARYNGOLOGY

## 2025-06-09 PROCEDURE — G8427 DOCREV CUR MEDS BY ELIG CLIN: HCPCS | Performed by: OTOLARYNGOLOGY

## 2025-06-09 PROCEDURE — 1123F ACP DISCUSS/DSCN MKR DOCD: CPT | Performed by: OTOLARYNGOLOGY

## 2025-06-09 PROCEDURE — G8417 CALC BMI ABV UP PARAM F/U: HCPCS | Performed by: OTOLARYNGOLOGY

## 2025-06-09 PROCEDURE — 3017F COLORECTAL CA SCREEN DOC REV: CPT | Performed by: OTOLARYNGOLOGY

## 2025-06-09 NOTE — PROGRESS NOTES
Assessment and Plan:   Left facial mass  History of prostate CA  - Neck CT is also consistent with left facial lipoma  - He has had a PET CT through [facility] - I do not have the results, but overall denies any mention of concerns in the neck  - He will prioritize his prostate CA treatment   - Follow up in 4-6 months and can discuss role of surgery further if desired, otherwise as long as no changes, reasonable to observe        The patient was instructed to return to clinic if no improvement or progression of symptoms. Signs to watch out for reviewed.      Shagufta Boyce MD   [facility]  [address]  [place] [address]  Office Phone: [phone]

## 2025-07-18 ENCOUNTER — HOSPITAL ENCOUNTER (OUTPATIENT)
Facility: HOSPITAL | Age: 65
Discharge: HOME | End: 2025-07-21
Payer: MEDICARE

## 2025-07-18 VITALS
HEART RATE: 88 BPM | BODY MASS INDEX: 31.94 KG/M2 | DIASTOLIC BLOOD PRESSURE: 78 MMHG | SYSTOLIC BLOOD PRESSURE: 130 MMHG | HEIGHT: 73 IN | RESPIRATION RATE: 18 BRPM | WEIGHT: 241 LBS

## 2025-07-18 DIAGNOSIS — C61 PROSTATE CANCER (HCC): Primary | ICD-10-CM

## 2025-07-18 PROCEDURE — 99213 OFFICE O/P EST LOW 20 MIN: CPT

## 2025-07-18 ASSESSMENT — PAIN SCALES - GENERAL: PAINLEVEL_OUTOF10: 0

## 2025-07-18 NOTE — CONSULTS
RADIATION ONCOLOGY OFFICE NOTE    Patient Name: Scot Norman Jr.  Patient YOB: 1960   Medical Record Number: [id number]  Referring Physician: Wili Salazar MD  [id number] [id number]  Primary Care Provider: Tena Davey MD    DIAGNOSIS & STAGING:  Cancer Staging   No matching staging information was found for the patient.      ICD-10-CM    1. Prostate cancer (HCC)  C61         AJCC Staging has been reviewed      CHIEF COMPLAINT: Favorable intermediate risk prostate cancer, [place] 3 + 4 (in a total of 1/12 template cores and 1/1 MRI targets), PSA 7.1 ng/mL, T1c.  Definitive radiation with prostate SBRT, 8 Gy x 5, with MRI/PSMA lesion microboost 9 Gy x 5.     RADIATION HISTORY:  Course: C1     Treatment Site Ref. ID Energy Dose/Fx (cGy) #Fx Dose Correction (cGy) Total Dose (cGy) Start Date End Date Elapsed Days   [id number]_[id number] [id number] 6X 800 5 / 5 0 4,000 6/25/2024 7/5/2024 10       RETURN VISITS:    4/30/24: Mr. Norman returns, accompanied by his wife today.  CT guided core needle biopsy 4/24/24; pathology showed right inguinal lymph nodes, favor benign reactive tissue. I discussed this case with the reading pathologist on the date of service. No evidence of malignancy, with two send out labs pending. My understanding is that the overall clinical suspicion is low, but if either pending lab is positive, I will plan to refer for heme/one workup.   His urinary symptoms are mild. IPSS = 2, QOL = unanswered . UDAY = 25. He states he is up to date on colon cancer screening with [employer] in 2023.  Of note, the patient reports eczema on all 4 limbs, including gluted folds. He has heard that this could explain lymphadenopathy. I reviewed this patient's case with Dr Leonardo on the date of service.     1/6/25: Today is approximately 6 months after the completion of radiation. His PSA is 2.89 ng/mL from 12/27/2024. He denies blood in urine or stool. His urinary symptoms are back at